# Patient Record
Sex: FEMALE | Race: BLACK OR AFRICAN AMERICAN
[De-identification: names, ages, dates, MRNs, and addresses within clinical notes are randomized per-mention and may not be internally consistent; named-entity substitution may affect disease eponyms.]

---

## 2017-11-13 ENCOUNTER — HOSPITAL ENCOUNTER (OUTPATIENT)
Dept: HOSPITAL 17 - HRAD | Age: 72
End: 2017-11-13
Attending: SPECIALIST
Payer: SELF-PAY

## 2017-11-13 DIAGNOSIS — R05: Primary | ICD-10-CM

## 2017-11-13 PROCEDURE — 71020: CPT

## 2017-11-13 NOTE — RADRPT
EXAM DATE/TIME:  11/13/2017 14:08 

 

[facility] COMPARISON:     

No previous studies available for comparison.

 

                     

INDICATIONS :     

Cough. 

                     

 

MEDICAL HISTORY :     

Hypertension.          

 

SURGICAL HISTORY :     

None.   

 

ENCOUNTER:     

Initial                                        

 

ACUITY:     

1 day      

 

PAIN SCORE:     

0/10

 

LOCATION:     

Bilateral chest 

 

FINDINGS:     

PA and lateral views of the chest demonstrate the lungs to be symmetrically aerated without evidence 
of mass, infiltrate or effusion.  The cardiomediastinal contours are unremarkable.  Osseous structure
s are intact.

 

CONCLUSION:     No acute disease.  

 

 

 

 Harrison Campbell Jr., MD on November 13, 2017 at 14:37           

Board Certified Radiologist.

 This report was verified electronically.

## 2018-03-19 ENCOUNTER — HOSPITAL ENCOUNTER (EMERGENCY)
Dept: HOSPITAL 17 - NEPE | Age: 73
Discharge: HOME | End: 2018-03-19
Payer: SELF-PAY

## 2018-03-19 VITALS — BODY MASS INDEX: 28.4 KG/M2 | WEIGHT: 154.32 LBS | HEIGHT: 62 IN

## 2018-03-19 VITALS
TEMPERATURE: 98.6 F | DIASTOLIC BLOOD PRESSURE: 94 MMHG | SYSTOLIC BLOOD PRESSURE: 190 MMHG | RESPIRATION RATE: 18 BRPM | OXYGEN SATURATION: 100 % | HEART RATE: 65 BPM

## 2018-03-19 VITALS
OXYGEN SATURATION: 98 % | RESPIRATION RATE: 18 BRPM | HEART RATE: 71 BPM | TEMPERATURE: 98.1 F | DIASTOLIC BLOOD PRESSURE: 74 MMHG | SYSTOLIC BLOOD PRESSURE: 185 MMHG

## 2018-03-19 VITALS — OXYGEN SATURATION: 100 % | RESPIRATION RATE: 18 BRPM | HEART RATE: 68 BPM

## 2018-03-19 DIAGNOSIS — R94.31: ICD-10-CM

## 2018-03-19 DIAGNOSIS — I10: ICD-10-CM

## 2018-03-19 DIAGNOSIS — N39.0: ICD-10-CM

## 2018-03-19 DIAGNOSIS — Z90.710: ICD-10-CM

## 2018-03-19 DIAGNOSIS — R42: Primary | ICD-10-CM

## 2018-03-19 DIAGNOSIS — Z21: ICD-10-CM

## 2018-03-19 DIAGNOSIS — R10.9: ICD-10-CM

## 2018-03-19 DIAGNOSIS — Z85.40: ICD-10-CM

## 2018-03-19 LAB
ALBUMIN SERPL-MCNC: 3.3 GM/DL (ref 3.4–5)
ALP SERPL-CCNC: 88 U/L (ref 45–117)
ALT SERPL-CCNC: 19 U/L (ref 10–53)
AST SERPL-CCNC: 16 U/L (ref 15–37)
BASOPHILS # BLD AUTO: 0 TH/MM3 (ref 0–0.2)
BASOPHILS NFR BLD: 0.6 % (ref 0–2)
BILIRUB SERPL-MCNC: 0.2 MG/DL (ref 0.2–1)
BUN SERPL-MCNC: 20 MG/DL (ref 7–18)
CALCIUM SERPL-MCNC: 8.8 MG/DL (ref 8.5–10.1)
CHLORIDE SERPL-SCNC: 103 MEQ/L (ref 98–107)
COLOR UR: (no result)
CREAT SERPL-MCNC: 1.16 MG/DL (ref 0.5–1)
EOSINOPHIL # BLD: 0.1 TH/MM3 (ref 0–0.4)
EOSINOPHIL NFR BLD: 1.2 % (ref 0–4)
ERYTHROCYTE [DISTWIDTH] IN BLOOD BY AUTOMATED COUNT: 15.7 % (ref 11.6–17.2)
GFR SERPLBLD BASED ON 1.73 SQ M-ARVRAT: 56 ML/MIN (ref 89–?)
GLUCOSE SERPL-MCNC: 109 MG/DL (ref 74–106)
GLUCOSE UR STRIP-MCNC: (no result) MG/DL
HCO3 BLD-SCNC: 29.4 MEQ/L (ref 21–32)
HCT VFR BLD CALC: 36 % (ref 35–46)
HGB BLD-MCNC: 12 GM/DL (ref 11.6–15.3)
HGB UR QL STRIP: (no result)
INR PPP: 1 RATIO
KETONES UR STRIP-MCNC: (no result) MG/DL
LYMPHOCYTES # BLD AUTO: 2.2 TH/MM3 (ref 1–4.8)
LYMPHOCYTES NFR BLD AUTO: 30.6 % (ref 9–44)
MAGNESIUM SERPL-MCNC: 2.3 MG/DL (ref 1.5–2.5)
MCH RBC QN AUTO: 29.4 PG (ref 27–34)
MCHC RBC AUTO-ENTMCNC: 33.3 % (ref 32–36)
MCV RBC AUTO: 88.3 FL (ref 80–100)
MONOCYTE #: 0.9 TH/MM3 (ref 0–0.9)
MONOCYTES NFR BLD: 12.2 % (ref 0–8)
MUCOUS THREADS #/AREA URNS LPF: (no result) /LPF
NEUTROPHILS # BLD AUTO: 3.9 TH/MM3 (ref 1.8–7.7)
NEUTROPHILS NFR BLD AUTO: 55.4 % (ref 16–70)
NITRITE UR QL STRIP: (no result)
PLATELET # BLD: 225 TH/MM3 (ref 150–450)
PMV BLD AUTO: 8.3 FL (ref 7–11)
PROT SERPL-MCNC: 8.1 GM/DL (ref 6.4–8.2)
PROTHROMBIN TIME: 10 SEC (ref 9.8–11.6)
RBC # BLD AUTO: 4.08 MIL/MM3 (ref 4–5.3)
SODIUM SERPL-SCNC: 140 MEQ/L (ref 136–145)
SP GR UR STRIP: 1.01 (ref 1–1.03)
SQUAMOUS #/AREA URNS HPF: 2 /HPF (ref 0–5)
TROPONIN I SERPL-MCNC: (no result) NG/ML (ref 0.02–0.05)
URINE LEUKOCYTE ESTERASE: (no result)
WBC # BLD AUTO: 7.1 TH/MM3 (ref 4–11)

## 2018-03-19 PROCEDURE — 84443 ASSAY THYROID STIM HORMONE: CPT

## 2018-03-19 PROCEDURE — 70450 CT HEAD/BRAIN W/O DYE: CPT

## 2018-03-19 PROCEDURE — 83690 ASSAY OF LIPASE: CPT

## 2018-03-19 PROCEDURE — 96374 THER/PROPH/DIAG INJ IV PUSH: CPT

## 2018-03-19 PROCEDURE — 82550 ASSAY OF CK (CPK): CPT

## 2018-03-19 PROCEDURE — 83735 ASSAY OF MAGNESIUM: CPT

## 2018-03-19 PROCEDURE — 80053 COMPREHEN METABOLIC PANEL: CPT

## 2018-03-19 PROCEDURE — 99285 EMERGENCY DEPT VISIT HI MDM: CPT

## 2018-03-19 PROCEDURE — 85730 THROMBOPLASTIN TIME PARTIAL: CPT

## 2018-03-19 PROCEDURE — 93005 ELECTROCARDIOGRAM TRACING: CPT

## 2018-03-19 PROCEDURE — 81001 URINALYSIS AUTO W/SCOPE: CPT

## 2018-03-19 PROCEDURE — 83880 ASSAY OF NATRIURETIC PEPTIDE: CPT

## 2018-03-19 PROCEDURE — 71045 X-RAY EXAM CHEST 1 VIEW: CPT

## 2018-03-19 PROCEDURE — 85610 PROTHROMBIN TIME: CPT

## 2018-03-19 PROCEDURE — 82552 ASSAY OF CPK IN BLOOD: CPT

## 2018-03-19 PROCEDURE — 85025 COMPLETE CBC W/AUTO DIFF WBC: CPT

## 2018-03-19 PROCEDURE — 84484 ASSAY OF TROPONIN QUANT: CPT

## 2018-03-19 NOTE — RADRPT
EXAM DATE/TIME:  03/19/2018 02:50 

 

[facility] COMPARISON:     

CHEST SINGLE AP, December 20, 2016, 16:14.

 

                     

INDICATIONS :     

Left lower chest pains, with shortness of breath.

                     

 

MEDICAL HISTORY :     

None.          

 

SURGICAL HISTORY :     

None.   

 

ENCOUNTER:     

Initial                                        

 

ACUITY:     

2 days      

 

PAIN SCORE:     

8/10

 

LOCATION:     

Left chest 

 

FINDINGS:     

A single view of the chest demonstrates the lungs to be symmetrically aerated without evidence of mas
s, infiltrate or effusion.  The cardiomediastinal contours are unremarkable.  Osseous structures are 
intact.

 

CONCLUSION:     The lungs are clear.

 

 

 

 Harrison Red MD on March 19, 2018 at 3:34           

Board Certified Radiologist.

 This report was verified electronically.

## 2018-03-19 NOTE — PD
HPI


Chief Complaint:  Dizziness


Time Seen by Provider:  02:43


Travel History


International Travel<30 days:  No


Contact w/Intl Traveler<30days:  No


Traveled to known affect area:  No





History of Present Illness


HPI


The patient is a 72 year old female who presents to the [facility] 

emergency department with a history of a sensation of dizziness that began upon 

awakening Saturday morning.  The patient reports that she sat up to go to the 

bathroom to urinate when she noticed a sensation of dizziness.  She has 

difficulty describing whether she felt lightheaded or noticed the room 

spinning.  She denies having any one-sided weakness, slurred speech, facial 

droop, difficulty with word finding ability or visual changes.  She denies 

having any prior history of stroke.  She does have a history of HIV and has 

been on retroviral medications for an extended period of time.  She last had 

laboratory studies done in December with her primary care physician and 

infectious disease specialist, Dr. Saeed.  She reports that her viral load was 

undetectable at that time.  The patient reports having an intermittent 

bitemporal headache.  She reports having intermittent left flank pain that has 

been ongoing for 3 months.  She reports having generalized stomach upset with a 

bowel movement that was softer than usual at the onset of the symptoms.  

Otherwise on review of systems, she denies having any known recent fevers, 

cough or congestion, neck pain or stiffness, chest pain, shortness of breath, 

vomiting, diarrhea, or urinary symptoms.





[facility]


Past Medical History


*** Narrative Medical


The patient's past medical history is significant for being HIV positive on 

retroviral medications with an undetectable viral load on last evaluation in 

December 2018 per her reports, hypertension, history of a gynecologic cancer 

status post hysterectomy.


Diminished Hearing:  No


Hypertension:  Yes


Tetanus Vaccination:  Unknown


Influenza Vaccination:  No





Past Surgical History


*** Narrative Surgical


The patient's past surgical history is significant for hysterectomy.


Hysterectomy:  Yes





Social History


Alcohol Use:  No


Tobacco Use:  No


Substance Use:  No





Allergies-Medications


(Allergen,Severity, Reaction):  


Coded Allergies:  


     oxyquinoline (Unverified  Allergy, Severe, 3/19/18)


 [id number]


Uncoded Allergies:  


     CHLOROQUINE (Allergy, Unknown, 9/9/03)


     NKA (Allergy, Unknown, 9/9/03)


Reported Meds & Prescriptions





Reported Meds & Active Scripts


Active


Keflex (Cephalexin) 500 Mg Capsule 500 Mg PO Q8H 10 Days


Meclizine (Meclizine HCl) 12.5 Mg Tab 12.5 Mg PO TID PRN


Reported


Metformin (Metformin HCl) 500 Mg Tab 500 Mg PO DAILY


     With a meal


Odefsey (Emtricitabine-Rilpivirine-Tenofovir Alafenam) 200-200-25 Mg Tab 1 Tab 

PO DAILY


Vitamin D2 (Ergocalciferol) 2,000 Unit Tab 50,000 Units PO 2XMONTH


Valtrex (Valacyclovir HCl) 1,000 Mg Tab 1,000 Mg PO DAILY


Hydrochlorothiazide 25 Mg Tab 25 Mg PO DAILY


Crestor (Rosuvastatin Calcium) 10 Mg Tab 10 Mg PO M W FRI


Neurontin (Gabapentin) 300 Mg Cap 300 Mg PO HS


Toprol XL (Metoprolol Succinate) 25 Mg Tab 25 Mg PO DAILY


Aspir-81 (Aspirin) 81 Mg Tabdr 81 Mg PO DAILY


Synthroid (Levothyroxine Sodium) 75 Mcg Tab 75 Mcg PO DAILY








Review of Systems


Except as stated in HPI:  all other systems reviewed are Neg


General / Constitutional:  No: Fever


Eyes:  No: Visual changes


HENT:  Positive: Headaches, Vertigo, Lightheadedness, No: Sore Throat, 

Rhinorrhea, Congestion, Neck Stiffness, Neck Pain


Cardiovascular:  No: Chest Pain or Discomfort


Respiratory:  No: Shortness of Breath


Gastrointestinal:  Positive: Abdominal Pain, Changes in Bowel Habits, No: Nausea

, Vomiting, Diarrhea, Hematochezia, Indigestion, Loss of Appetite


Genitourinary:  Positive: Flank Pain, No: Urgency, Frequency, Dysuria


Musculoskeletal:  Positive: Myalgias, No: Pain


Skin:  No Rash


Neurologic:  Positive: Dizziness, Headache, No: Weakness, Focal Abnormalities, 

Change in Mentation, Slurred Speech, Sensory Disturbance


Psychiatric:  No: Depression


Endocrine:  No: Polydipsia


Hematologic/Lymphatic:  No: Easy Bruising





Physical Exam


Narrative


General: 


The patient is a well-developed well-nourished female in no acute distress. 





Head and Neck exam: 


Head is normocephalic atraumatic. 


Eyes: EOMI, pupils are equal round and reactive to light. 


Nose: Midline septum with pink mucous membranes 


Mouth: Dentition unremarkable. Moist mucus membranes. Posterior oropharynx is 

not erythematous. No tonsillar hypertrophy. Uvula midline. Airway patent. 


Neck: No palpable lymphadenopathy. No nuchal rigidity. No thyromegaly. 





Cardiovascular: 


Regular rate and rhythm without murmurs, gallops, or rubs. 





Lungs: 


Clear to auscultation bilaterally. No wheezes, rhonchi, or rales.


 


Abdomen:


Soft, without tenderness to palpation in all 4 quadrants of the abdomen. No 

guarding, rebound, or rigidity.  Normal bowel sounds are audible.  No 

tenderness on palpation of McBurney's point.  Negative Kumar's sign. 





Extremities: 


No clubbing, cyanosis, or edema. 2+ pulses in all 4 extremities. 





Back: 


No spinous process tenderness to palpation. No costovertebral angle tenderness 

to palpation. 





Neurologic Exam:


Cranial nerves 2-12 were intact on exam. Strength is 5/5 in all 4 extremities. 

No sensory deficits noted. No dysdiadochokinesis. Good finger to nose and Heel 

to parada bilaterally.  The patient is able to ambulate in the emergency 

department without any difficulty.  No nystagmus noted on examination of her 

cranial nerves.





Skin Exam: No rash noted. Intact skin that is warm and dry.





Data


Data


Last Documented VS





Vital Signs








  Date Time  Temp Pulse Resp B/P (MAP) Pulse Ox O2 Delivery O2 Flow Rate FiO2


 


3/19/18 06:43        


 


3/19/18 02:50  68 18  100 Room Air  


 


3/19/18 02:49 98.6       








Orders





 Orders


Electrocardiogram (3/19/18 02:45)


Complete Blood Count With Diff (3/19/18 02:45)


Comprehensive Metabolic Panel (3/19/18 02:45)


Creatine Kinase (Cpk) (3/19/18 02:45)


Ckmb (Isoenzyme) Profile (3/19/18 02:45)


Troponin I (3/19/18 02:45)


B-Type Natriuretic Peptide (3/19/18 02:45)


Prothrombin Time / Inr (Pt) (3/19/18 02:45)


Act Partial Throm Time (Ptt) (3/19/18 02:45)


Lipase (3/19/18 02:45)


Urinalysis - C+S If Indicated (3/19/18 02:45)


Magnesium (Mg) (3/19/18 02:45)


Thyroid Stimulating Hormone (3/19/18 02:45)


Chest, Single Ap (3/19/18 02:45)


Ct Brain W/O Iv Contrast(Rout) (3/19/18 02:45)


Iv Access Insert/Monitor (3/19/18 02:45)


Ecg Monitoring (3/19/18 02:45)


Oximetry (3/19/18 02:45)


CKMB (3/19/18 03:05)


CKMB% (3/19/18 03:05)


Sodium Chlorid 0.9% 500 Ml Inj (Ns 500 M (3/19/18 05:45)


Ceftriaxone Inj (Rocephin Inj) (3/19/18 05:45)


Meclizine (Antivert) (3/19/18 06:00)





Labs





Laboratory Tests








Test


  3/19/18


03:05 3/19/18


04:45


 


White Blood Count 7.1 TH/MM3  


 


Red Blood Count 4.08 MIL/MM3  


 


Hemoglobin 12.0 GM/DL  


 


Hematocrit 36.0 %  


 


Mean Corpuscular Volume 88.3 FL  


 


Mean Corpuscular Hemoglobin 29.4 PG  


 


Mean Corpuscular Hemoglobin


Concent 33.3 % 


  


 


 


Red Cell Distribution Width 15.7 %  


 


Platelet Count 225 TH/MM3  


 


Mean Platelet Volume 8.3 FL  


 


Neutrophils (%) (Auto) 55.4 %  


 


Lymphocytes (%) (Auto) 30.6 %  


 


Monocytes (%) (Auto) 12.2 %  


 


Eosinophils (%) (Auto) 1.2 %  


 


Basophils (%) (Auto) 0.6 %  


 


Neutrophils # (Auto) 3.9 TH/MM3  


 


Lymphocytes # (Auto) 2.2 TH/MM3  


 


Monocytes # (Auto) 0.9 TH/MM3  


 


Eosinophils # (Auto) 0.1 TH/MM3  


 


Basophils # (Auto) 0.0 TH/MM3  


 


CBC Comment DIFF FINAL  


 


Differential Comment   


 


Prothrombin Time 10.0 SEC  


 


Prothromb Time International


Ratio 1.0 RATIO 


  


 


 


Activated Partial


Thromboplast Time 30.7 SEC 


  


 


 


Blood Urea Nitrogen 20 MG/DL  


 


Creatinine 1.16 MG/DL  


 


Random Glucose 109 MG/DL  


 


Total Protein 8.1 GM/DL  


 


Albumin 3.3 GM/DL  


 


Calcium Level 8.8 MG/DL  


 


Magnesium Level 2.3 MG/DL  


 


Alkaline Phosphatase 88 U/L  


 


Aspartate Amino Transf


(AST/SGOT) 16 U/L 


  


 


 


Alanine Aminotransferase


(ALT/SGPT) 19 U/L 


  


 


 


Total Bilirubin 0.2 MG/DL  


 


Sodium Level 140 MEQ/L  


 


Potassium Level 3.6 MEQ/L  


 


Chloride Level 103 MEQ/L  


 


Carbon Dioxide Level 29.4 MEQ/L  


 


Anion Gap 8 MEQ/L  


 


Estimat Glomerular Filtration


Rate 56 ML/MIN 


  


 


 


Total Creatine Kinase 102 U/L  


 


Creatine Kinase MB 1.3 NG/ML  


 


Troponin I


  LESS THAN 0.02


NG/ML 


 


 


B-Type Natriuretic Peptide 14 PG/ML  


 


Lipase 210 U/L  


 


Thyroid Stimulating Hormone


3rd Gen 1.840 uIU/ML 


  


 


 


Urine Color  LIGHT-YELLOW 


 


Urine Turbidity  CLEAR 


 


Urine pH  6.0 


 


Urine Specific Gravity  1.011 


 


Urine Protein  NEG mg/dL 


 


Urine Glucose (UA)  NEG mg/dL 


 


Urine Ketones  NEG mg/dL 


 


Urine Occult Blood  NEG 


 


Urine Nitrite  NEG 


 


Urine Bilirubin  NEG 


 


Urine Urobilinogen


  


  LESS THAN 2.0


MG/DL


 


Urine Leukocyte Esterase  MOD 


 


Urine WBC  7 /hpf 


 


Urine Squamous Epithelial


Cells 


  2 /hpf 


 


 


Urine Mucus  FEW /lpf 


 


Microscopic Urinalysis Comment


  


  CULT NOT


INDICATED











MDM


Medical Decision Making


Medical Screen Exam Complete:  Yes


Emergency Medical Condition:  Yes


Medical Record Reviewed:  Yes


Interpretation(s)





Last Impressions








Head CT 3/19/18 0245 Signed





Impressions: 





 Service Date/Time:  Monday, March 19, 2018 02:58 - CONCLUSION:  1. No acute 





 findings in the brain.     Harrison Red MD 


 


Chest X-Ray 3/19/18 0245 Signed





Impressions: 





 Service Date/Time:  Monday, March 19, 2018 02:50 - CONCLUSION: The lungs are 





 clear.     Harrison Red MD 








Differential Diagnosis


Benign positional vertigo, versus lightheaded sensation, versus labyrinthitis, 

versus intracranial mass, versus stroke, versus Mnire's disease


Narrative Course


During the course of the patient's emergency department visit, the patient's 

history, examination, and differential diagnosis were reviewed with the 

patient. The patient was placed on a cardiac monitor with oximetry and frequent 

blood pressure monitoring. The patient had  IV access obtained and blood work 

sent for analysis.  The patient had a EKG done on arrival that shows a sinus 

rhythm with occasional supraventricular premature complexes, heart rate is 62, 

QRS duration 85 ms,  ms.  No acute ST segment elevation.





The patient was initially provided Normal saline at 500 mL bolus, Rocephin 1 g 

IV when a urinary tract infection was identified, and Antivert 25 mg p.o. 1.





The patient studies were remarkable for white cell count of 7.1, hemoglobin 12, 

platelets 225 with 12.2 monocytes.  Chemistry panel is remarkable for BUN of 20

, creatinine 1.16, glucose 109, cardiac enzymes within normal limits, TSH 

within normal limits, lipase 210, BNP 14, PT 10, PTT 30.30.7.  Urinalysis shows 

moderate leukocyte esterase 7 WBCs.  Chest x-ray shows no acute cardiopulmonary 

disease.  CT scan of the brain shows no acute abnormality.





The patient was ambulated in the emergency department was able to walk back and 

forth to the bathroom without any difficulty.  The patient will be discharged 

home.





The patient is resting comfortably and feels better, is alert and in no 

distress. The patient's results and examination findings were discussed with 

the patient. The repeat examination is unremarkable and benign. The history, 

exam, diagnostic testing, and current condition do not suggest any significant 

pathology to warrant further testing, continued ED treatment, admission, or 

surgical evaluation at this point. The vital signs have been stable. The 

patient does not have uncontrollable pain, intractable vomiting, or other 

significant symptoms. The patient's condition is stable and appropriate for 

discharge. The patient will pursue further outpatient evaluation with a primary 

care physician or other designated or consulting physician as indicated in the 

discharge instructions. The patient expressed understanding and was agreeable 

with this plan.





Diagnosis





 Primary Impression:  


 Vertigo


 Additional Impression:  


 UTI (urinary tract infection)


 Qualified Codes:  N39.0 - Urinary tract infection, site not specified


Referrals:  


Primary Care Physician


2 days


Patient Instructions:  General Instructions, Urinary Tract Infection in Women (

ED), Vertigo (ED)


***Med/Other Pt SpecificInfo:  Prescription(s) given


Scripts


Cephalexin (Keflex) 500 Mg Capsule


500 MG PO Q8H for Infection for 10 Days, #30 CAP 0 Refills


   Prov: Asiya Meza MD         3/19/18 


Meclizine (Meclizine) 12.5 Mg Tab


12.5 MG PO TID Y for VERTIGO, #12 TAB 0 Refills


   Prov: Asiya Meza MD         3/19/18


Disposition:  01 DISCHARGE HOME


Condition:  Stable











Asiya Meza MD Mar 19, 2018 02:46

## 2018-03-19 NOTE — EKG
Date Performed: 03/19/2018       Time Performed: 03:11:19

 

PTAGE:      72 years

 

EKG:      Sinus rhythm 

 

 WITH OCCASIONAL SUPRAVENTRICULAR PREMATURE COMPLEXES POSSIBLE LEFT ATRIAL ENLARGEMENT POSSIBLE RIGHT
 VENTRICULAR CONDUCTION DELAY POSSIBLE LEFT VENTRICULAR HYPERTROPHY MODERATE T-WAVE ABNORMALITY, CONS
IDER LATERAL ISCHEMIA ABNORMAL ECG

 

PREVIOUS TRACING       : 05/01/2002 21.09 Since the previous tracing, no significant change noted

 

DOCTOR:   Loreto Doll  Interpretating Date/Time  03/19/2018 11:51:04

## 2018-03-19 NOTE — RADRPT
EXAM DATE/TIME:  03/19/2018 02:58 

 

[facility] COMPARISON:     

No previous studies available for comparison.

 

 

INDICATIONS :     

Dizziness.

                      

 

RADIATION DOSE:     

56.35 CTDIvol (mGy) 

 

 

 

MEDICAL HISTORY :     

Hypertension. Cardiovascular disease 

 

SURGICAL HISTORY :      

None. 

 

ENCOUNTER:      

Initial

 

ACUITY:      

1 day

 

PAIN SCALE:      

0/10

 

LOCATION:        

cranial 

 

TECHNIQUE:     

Multiple contiguous axial images were obtained of the head.  Using automated exposure control and adj
ustment of the mA and/or kV according to patient size, radiation dose was kept as low as reasonably a
chievable to obtain optimal diagnostic quality images.   DICOM format image data is available electro
nically for review and comparison.  

 

FINDINGS:     

 

CEREBRUM:     

The ventricles are normal for age.  No evidence of midline shift, mass lesion, hemorrhage or acute in
farction.  No extra-axial fluid collections are seen.

 

POSTERIOR FOSSA:     

The cerebellum and brainstem are intact.  The 4th ventricle is midline.  The cerebellopontine angle i
s unremarkable.

 

EXTRACRANIAL:     

The visualized portion of the orbits is intact.

 

SKULL:     

The calvaria is intact.  No evidence of skull fracture.

 

CONCLUSION:     

1. No acute findings in the brain.

 

 

 

 Harrison Red MD on March 19, 2018 at 3:41           

Board Certified Radiologist.

 This report was verified electronically.